# Patient Record
Sex: MALE | Race: ASIAN | NOT HISPANIC OR LATINO | Employment: FULL TIME | URBAN - METROPOLITAN AREA
[De-identification: names, ages, dates, MRNs, and addresses within clinical notes are randomized per-mention and may not be internally consistent; named-entity substitution may affect disease eponyms.]

---

## 2020-03-09 ENCOUNTER — TRANSCRIBE ORDERS (OUTPATIENT)
Dept: ADMINISTRATIVE | Facility: HOSPITAL | Age: 45
End: 2020-03-09

## 2020-03-09 ENCOUNTER — LAB (OUTPATIENT)
Dept: LAB | Facility: HOSPITAL | Age: 45
End: 2020-03-09

## 2020-03-09 DIAGNOSIS — M25.562 LEFT KNEE PAIN, UNSPECIFIED CHRONICITY: Primary | ICD-10-CM

## 2020-03-09 DIAGNOSIS — M25.562 LEFT KNEE PAIN, UNSPECIFIED CHRONICITY: ICD-10-CM

## 2020-03-09 PROCEDURE — 87015 SPECIMEN INFECT AGNT CONCNTJ: CPT

## 2020-03-09 PROCEDURE — 87205 SMEAR GRAM STAIN: CPT

## 2020-03-09 PROCEDURE — 87070 CULTURE OTHR SPECIMN AEROBIC: CPT

## 2020-03-14 LAB
BACTERIA FLD CULT: NORMAL
GRAM STN SPEC: NORMAL

## 2021-02-08 ENCOUNTER — LAB (OUTPATIENT)
Dept: LAB | Facility: HOSPITAL | Age: 46
End: 2021-02-08

## 2021-02-08 ENCOUNTER — OFFICE VISIT (OUTPATIENT)
Dept: INTERNAL MEDICINE | Facility: CLINIC | Age: 46
End: 2021-02-08

## 2021-02-08 VITALS
WEIGHT: 147.6 LBS | HEIGHT: 66 IN | OXYGEN SATURATION: 99 % | BODY MASS INDEX: 23.72 KG/M2 | RESPIRATION RATE: 16 BRPM | TEMPERATURE: 97.4 F | DIASTOLIC BLOOD PRESSURE: 84 MMHG | HEART RATE: 66 BPM | SYSTOLIC BLOOD PRESSURE: 122 MMHG

## 2021-02-08 DIAGNOSIS — Z13.29 SCREENING FOR THYROID DISORDER: ICD-10-CM

## 2021-02-08 DIAGNOSIS — Z13.21 ENCOUNTER FOR VITAMIN DEFICIENCY SCREENING: ICD-10-CM

## 2021-02-08 DIAGNOSIS — Z00.00 VISIT FOR WELL MAN HEALTH CHECK: Primary | ICD-10-CM

## 2021-02-08 DIAGNOSIS — Z13.1 SCREENING FOR DIABETES MELLITUS: ICD-10-CM

## 2021-02-08 DIAGNOSIS — J45.990 EXERCISE-INDUCED ASTHMA: ICD-10-CM

## 2021-02-08 DIAGNOSIS — Z11.59 ENCOUNTER FOR HEPATITIS C SCREENING TEST FOR LOW RISK PATIENT: ICD-10-CM

## 2021-02-08 DIAGNOSIS — Z13.220 SCREENING FOR LIPID DISORDERS: ICD-10-CM

## 2021-02-08 LAB
25(OH)D3 SERPL-MCNC: 22.9 NG/ML (ref 30–100)
ALBUMIN SERPL-MCNC: 4.4 G/DL (ref 3.5–5.2)
ALBUMIN/GLOB SERPL: 1.6 G/DL
ALP SERPL-CCNC: 51 U/L (ref 39–117)
ALT SERPL W P-5'-P-CCNC: 19 U/L (ref 1–41)
ANION GAP SERPL CALCULATED.3IONS-SCNC: 9.2 MMOL/L (ref 5–15)
AST SERPL-CCNC: 24 U/L (ref 1–40)
BASOPHILS # BLD AUTO: 0.03 10*3/MM3 (ref 0–0.2)
BASOPHILS NFR BLD AUTO: 0.6 % (ref 0–1.5)
BILIRUB SERPL-MCNC: 0.4 MG/DL (ref 0–1.2)
BUN SERPL-MCNC: 18 MG/DL (ref 6–20)
BUN/CREAT SERPL: 18.9 (ref 7–25)
CALCIUM SPEC-SCNC: 9 MG/DL (ref 8.6–10.5)
CHLORIDE SERPL-SCNC: 101 MMOL/L (ref 98–107)
CHOLEST SERPL-MCNC: 164 MG/DL (ref 0–200)
CO2 SERPL-SCNC: 27.8 MMOL/L (ref 22–29)
CREAT SERPL-MCNC: 0.95 MG/DL (ref 0.76–1.27)
DEPRECATED RDW RBC AUTO: 39.4 FL (ref 37–54)
EOSINOPHIL # BLD AUTO: 0.28 10*3/MM3 (ref 0–0.4)
EOSINOPHIL NFR BLD AUTO: 5.3 % (ref 0.3–6.2)
ERYTHROCYTE [DISTWIDTH] IN BLOOD BY AUTOMATED COUNT: 12.4 % (ref 12.3–15.4)
GFR SERPL CREATININE-BSD FRML MDRD: 105 ML/MIN/1.73
GFR SERPL CREATININE-BSD FRML MDRD: 87 ML/MIN/1.73
GLOBULIN UR ELPH-MCNC: 2.7 GM/DL
GLUCOSE SERPL-MCNC: 87 MG/DL (ref 65–99)
HBA1C MFR BLD: 5.2 % (ref 4.8–5.6)
HCT VFR BLD AUTO: 49.2 % (ref 37.5–51)
HCV AB SER DONR QL: NORMAL
HDLC SERPL-MCNC: 42 MG/DL (ref 40–60)
HGB BLD-MCNC: 16.8 G/DL (ref 13–17.7)
IMM GRANULOCYTES # BLD AUTO: 0.01 10*3/MM3 (ref 0–0.05)
IMM GRANULOCYTES NFR BLD AUTO: 0.2 % (ref 0–0.5)
LDLC SERPL CALC-MCNC: 105 MG/DL (ref 0–100)
LDLC/HDLC SERPL: 2.47 {RATIO}
LYMPHOCYTES # BLD AUTO: 1.87 10*3/MM3 (ref 0.7–3.1)
LYMPHOCYTES NFR BLD AUTO: 35.3 % (ref 19.6–45.3)
MCH RBC QN AUTO: 29.8 PG (ref 26.6–33)
MCHC RBC AUTO-ENTMCNC: 34.1 G/DL (ref 31.5–35.7)
MCV RBC AUTO: 87.4 FL (ref 79–97)
MONOCYTES # BLD AUTO: 0.47 10*3/MM3 (ref 0.1–0.9)
MONOCYTES NFR BLD AUTO: 8.9 % (ref 5–12)
NEUTROPHILS NFR BLD AUTO: 2.63 10*3/MM3 (ref 1.7–7)
NEUTROPHILS NFR BLD AUTO: 49.7 % (ref 42.7–76)
NRBC BLD AUTO-RTO: 0 /100 WBC (ref 0–0.2)
PLATELET # BLD AUTO: 161 10*3/MM3 (ref 140–450)
PMV BLD AUTO: 12.5 FL (ref 6–12)
POTASSIUM SERPL-SCNC: 4.4 MMOL/L (ref 3.5–5.2)
PROT SERPL-MCNC: 7.1 G/DL (ref 6–8.5)
RBC # BLD AUTO: 5.63 10*6/MM3 (ref 4.14–5.8)
SODIUM SERPL-SCNC: 138 MMOL/L (ref 136–145)
T-UPTAKE NFR SERPL: 1.03 TBI (ref 0.8–1.3)
T4 SERPL-MCNC: 7.29 MCG/DL (ref 4.5–11.7)
TRIGL SERPL-MCNC: 92 MG/DL (ref 0–150)
TSH SERPL DL<=0.05 MIU/L-ACNC: 1.17 UIU/ML (ref 0.27–4.2)
VLDLC SERPL-MCNC: 17 MG/DL (ref 5–40)
WBC # BLD AUTO: 5.29 10*3/MM3 (ref 3.4–10.8)

## 2021-02-08 PROCEDURE — 84479 ASSAY OF THYROID (T3 OR T4): CPT | Performed by: FAMILY MEDICINE

## 2021-02-08 PROCEDURE — 80061 LIPID PANEL: CPT | Performed by: FAMILY MEDICINE

## 2021-02-08 PROCEDURE — 99386 PREV VISIT NEW AGE 40-64: CPT | Performed by: FAMILY MEDICINE

## 2021-02-08 PROCEDURE — 85025 COMPLETE CBC W/AUTO DIFF WBC: CPT | Performed by: FAMILY MEDICINE

## 2021-02-08 PROCEDURE — 36415 COLL VENOUS BLD VENIPUNCTURE: CPT | Performed by: FAMILY MEDICINE

## 2021-02-08 PROCEDURE — 84443 ASSAY THYROID STIM HORMONE: CPT | Performed by: FAMILY MEDICINE

## 2021-02-08 PROCEDURE — 99213 OFFICE O/P EST LOW 20 MIN: CPT | Performed by: FAMILY MEDICINE

## 2021-02-08 PROCEDURE — 83036 HEMOGLOBIN GLYCOSYLATED A1C: CPT | Performed by: FAMILY MEDICINE

## 2021-02-08 PROCEDURE — 82306 VITAMIN D 25 HYDROXY: CPT | Performed by: FAMILY MEDICINE

## 2021-02-08 PROCEDURE — 84436 ASSAY OF TOTAL THYROXINE: CPT | Performed by: FAMILY MEDICINE

## 2021-02-08 PROCEDURE — 80053 COMPREHEN METABOLIC PANEL: CPT | Performed by: FAMILY MEDICINE

## 2021-02-08 PROCEDURE — 86803 HEPATITIS C AB TEST: CPT | Performed by: FAMILY MEDICINE

## 2021-02-08 RX ORDER — ALBUTEROL SULFATE 90 UG/1
2 AEROSOL, METERED RESPIRATORY (INHALATION) EVERY 4 HOURS PRN
Qty: 18 G | Refills: 2 | Status: SHIPPED | OUTPATIENT
Start: 2021-02-08

## 2021-02-08 NOTE — PROGRESS NOTES
Subjective   Latonya Rosa is a 45 y.o. male and is here for a comprehensive physical exam. The patient reports no problems.    Patient notes that occasionally he does have some shortness of breath, he believes is from the cold.  Patient states that he feels that it could be from wearing a mask.  When he does not wear a mask he states that he can feel he is breathing well.  However he can have some shortness of breath after physical activity or during it.  He has no other prior history.        Social History:   Social History     Socioeconomic History   • Marital status:      Spouse name: Not on file   • Number of children: Not on file   • Years of education: Not on file   • Highest education level: Not on file   Tobacco Use   • Smoking status: Never Smoker   • Smokeless tobacco: Never Used   Substance and Sexual Activity   • Alcohol use: Yes   • Drug use: Never   • Sexual activity: Yes     Partners: Male       Family History: History reviewed. No pertinent family history.    Past Medical History: History reviewed. No pertinent past medical history.    The following portions of the patient's history were reviewed and updated as appropriate: allergies, current medications, past family history, past medical history, past social history, past surgical history and problem list.    Review of Systems    Review of Systems   Constitutional: Negative for chills and fever.   HENT: Negative for congestion, rhinorrhea, sinus pain and sore throat.    Eyes: Negative for photophobia and visual disturbance.   Respiratory: Negative for cough, chest tightness and shortness of breath.    Cardiovascular: Negative for chest pain and palpitations.   Gastrointestinal: Negative for diarrhea, nausea and vomiting.   Genitourinary: Negative for dysuria, frequency and urgency.   Skin: Negative for rash and wound.   Neurological: Negative for dizziness and syncope.   Psychiatric/Behavioral: Negative for behavioral problems and  confusion.       Objective   Physical Exam  Vitals signs and nursing note reviewed.   Constitutional:       Appearance: He is well-developed.   HENT:      Head: Normocephalic and atraumatic.      Right Ear: External ear normal.      Left Ear: External ear normal.   Neck:      Musculoskeletal: Normal range of motion and neck supple.   Cardiovascular:      Rate and Rhythm: Normal rate and regular rhythm.      Heart sounds: Normal heart sounds.   Pulmonary:      Effort: Pulmonary effort is normal. No respiratory distress.      Breath sounds: Normal breath sounds.   Abdominal:      Palpations: Abdomen is soft.      Tenderness: There is no abdominal tenderness. There is no guarding.   Musculoskeletal: Normal range of motion.   Lymphadenopathy:      Cervical: No cervical adenopathy.   Skin:     General: Skin is warm.   Neurological:      Mental Status: He is alert and oriented to person, place, and time.   Psychiatric:         Behavior: Behavior normal.         Vitals:    02/08/21 0910   BP: 122/84   Pulse: 66   Resp: 16   Temp: 97.4 °F (36.3 °C)   SpO2: 99%     Body mass index is 24.01 kg/m².    Medications:   Current Outpatient Medications:   •  albuterol sulfate  (90 Base) MCG/ACT inhaler, Inhale 2 puffs Every 4 (Four) Hours As Needed for Wheezing or Shortness of Air., Disp: 18 g, Rfl: 2       Assessment/Plan   Healthy male exam.      1. Healthcare Maintenance:  2. Patient Counseling:  --Nutrition: Stressed importance of moderation in sodium/caffeine intake, saturated fat and cholesterol, caloric balance, sufficient intake of fresh fruits, vegetables, fiber, calcium and vit D  --Exercise: Recommended 30 minutes of exercise daily.   --Immunizations reviewed.  --Discussed benefits of screening colonoscopy. Start screening in 2 years.     Diagnoses and all orders for this visit:    Visit for [facility] health check  -     CBC & Differential  -     Comprehensive Metabolic Panel  -     CBC Auto  Differential    Screening for lipid disorders  -     Lipid Panel With LDL / HDL Ratio    Screening for diabetes mellitus  -     Hemoglobin A1c    Screening for thyroid disorder  -     Thyroid Panel With TSH    Encounter for vitamin deficiency screening  -     Vitamin D 25 Hydroxy    Encounter for hepatitis C screening test for low risk patient  -     Hepatitis C Antibody    Exercise-induced asthma        -     We will start patient on albuterol inhaler, and see how he is doing.  We will follow up in 1 month.    Other orders  -     albuterol sulfate  (90 Base) MCG/ACT inhaler; Inhale 2 puffs Every 4 (Four) Hours As Needed for Wheezing or Shortness of Air.        No follow-ups on file.           Dictated utilizing Dragon Voice Recognition Software

## 2021-02-09 DIAGNOSIS — E55.9 VITAMIN D DEFICIENCY: Primary | ICD-10-CM

## 2021-02-09 NOTE — PROGRESS NOTES
Please inform the patient of the following abnormal results.  Low vitamin D.  Patient needs to start doing 5000 IUs of vitamin D daily.  Would like to recheck in 4 months.

## 2025-06-27 ENCOUNTER — HOSPITAL ENCOUNTER (OUTPATIENT)
Facility: HOSPITAL | Age: 50
Setting detail: OBSERVATION
Discharge: HOME | End: 2025-06-27
Attending: EMERGENCY MEDICINE | Admitting: EMERGENCY MEDICINE
Payer: COMMERCIAL

## 2025-06-27 ENCOUNTER — APPOINTMENT (OUTPATIENT)
Dept: GENERAL RADIOLOGY | Facility: HOSPITAL | Age: 50
End: 2025-06-27
Payer: COMMERCIAL

## 2025-06-27 ENCOUNTER — APPOINTMENT (OUTPATIENT)
Dept: CARDIOLOGY | Facility: HOSPITAL | Age: 50
End: 2025-06-27
Payer: COMMERCIAL

## 2025-06-27 ENCOUNTER — READMISSION MANAGEMENT (OUTPATIENT)
Dept: CALL CENTER | Facility: HOSPITAL | Age: 50
End: 2025-06-27
Payer: COMMERCIAL

## 2025-06-27 VITALS
SYSTOLIC BLOOD PRESSURE: 150 MMHG | HEIGHT: 67 IN | RESPIRATION RATE: 16 BRPM | HEART RATE: 68 BPM | OXYGEN SATURATION: 98 % | TEMPERATURE: 97.9 F | BODY MASS INDEX: 24.91 KG/M2 | DIASTOLIC BLOOD PRESSURE: 101 MMHG | WEIGHT: 158.73 LBS

## 2025-06-27 DIAGNOSIS — R07.89 OTHER CHEST PAIN: Primary | ICD-10-CM

## 2025-06-27 PROBLEM — R07.9 CHEST PAIN: Status: ACTIVE | Noted: 2025-06-27

## 2025-06-27 LAB
ALBUMIN SERPL-MCNC: 4.5 G/DL (ref 3.5–5.2)
ALBUMIN/GLOB SERPL: 2 G/DL
ALP SERPL-CCNC: 55 U/L (ref 39–117)
ALT SERPL W P-5'-P-CCNC: 26 U/L (ref 1–41)
ANION GAP SERPL CALCULATED.3IONS-SCNC: 12 MMOL/L (ref 5–15)
AORTIC DIMENSIONLESS INDEX: 0.91 (DI)
ASCENDING AORTA: 2.9 CM
AST SERPL-CCNC: 24 U/L (ref 1–40)
AV MEAN PRESS GRAD SYS DOP V1V2: 2.7 MMHG
AV VMAX SYS DOP: 112.7 CM/SEC
BASOPHILS # BLD AUTO: 0.03 10*3/MM3 (ref 0–0.2)
BASOPHILS NFR BLD AUTO: 0.4 % (ref 0–1.5)
BH CV ECHO MEAS - ACS: 1.82 CM
BH CV ECHO MEAS - AO MAX PG: 5.1 MMHG
BH CV ECHO MEAS - AO ROOT DIAM: 3.2 CM
BH CV ECHO MEAS - AO V2 VTI: 21.3 CM
BH CV ECHO MEAS - AVA(I,D): 2.39 CM2
BH CV ECHO MEAS - EDV(CUBED): 62.5 ML
BH CV ECHO MEAS - EDV(MOD-SP2): 88 ML
BH CV ECHO MEAS - EDV(MOD-SP4): 81 ML
BH CV ECHO MEAS - EF(MOD-SP2): 70.5 %
BH CV ECHO MEAS - EF(MOD-SP4): 69.1 %
BH CV ECHO MEAS - ESV(CUBED): 20.1 ML
BH CV ECHO MEAS - ESV(MOD-SP2): 26 ML
BH CV ECHO MEAS - ESV(MOD-SP4): 25 ML
BH CV ECHO MEAS - FS: 31.5 %
BH CV ECHO MEAS - IVS/LVPW: 4.4 CM
BH CV ECHO MEAS - IVSD: 4.9 CM
BH CV ECHO MEAS - LAT PEAK E' VEL: 13.4 CM/SEC
BH CV ECHO MEAS - LV DIASTOLIC VOL/BSA (35-75): 44.2 CM2
BH CV ECHO MEAS - LV MASS(C)D: 773.7 GRAMS
BH CV ECHO MEAS - LV MAX PG: 4.9 MMHG
BH CV ECHO MEAS - LV MEAN PG: 2.18 MMHG
BH CV ECHO MEAS - LV SYSTOLIC VOL/BSA (12-30): 13.6 CM2
BH CV ECHO MEAS - LV V1 MAX: 110.6 CM/SEC
BH CV ECHO MEAS - LV V1 VTI: 19.4 CM
BH CV ECHO MEAS - LVIDD: 4 CM
BH CV ECHO MEAS - LVIDS: 2.7 CM
BH CV ECHO MEAS - LVOT AREA: 2.6 CM2
BH CV ECHO MEAS - LVOT DIAM: 1.82 CM
BH CV ECHO MEAS - LVPWD: 1.1 CM
BH CV ECHO MEAS - MED PEAK E' VEL: 7.7 CM/SEC
BH CV ECHO MEAS - MV A DUR: 0.13 SEC
BH CV ECHO MEAS - MV A MAX VEL: 68.1 CM/SEC
BH CV ECHO MEAS - MV DEC SLOPE: 646.6 CM/SEC2
BH CV ECHO MEAS - MV DEC TIME: 0.14 SEC
BH CV ECHO MEAS - MV E MAX VEL: 82.3 CM/SEC
BH CV ECHO MEAS - MV E/A: 1.21
BH CV ECHO MEAS - MV MAX PG: 4.5 MMHG
BH CV ECHO MEAS - MV MEAN PG: 1.34 MMHG
BH CV ECHO MEAS - MV P1/2T: 48.6 MSEC
BH CV ECHO MEAS - MV V2 VTI: 24.5 CM
BH CV ECHO MEAS - MVA(P1/2T): 4.5 CM2
BH CV ECHO MEAS - MVA(VTI): 2.07 CM2
BH CV ECHO MEAS - PA ACC TIME: 0.1 SEC
BH CV ECHO MEAS - PA V2 MAX: 82.8 CM/SEC
BH CV ECHO MEAS - PULM A REVS DUR: 0.13 SEC
BH CV ECHO MEAS - PULM A REVS VEL: 24.4 CM/SEC
BH CV ECHO MEAS - PULM SYS VEL: 18.9 CM/SEC
BH CV ECHO MEAS - RAP SYSTOLE: 8 MMHG
BH CV ECHO MEAS - RV MAX PG: 2.07 MMHG
BH CV ECHO MEAS - RV V1 MAX: 72 CM/SEC
BH CV ECHO MEAS - RV V1 VTI: 14.6 CM
BH CV ECHO MEAS - RVSP: 28 MMHG
BH CV ECHO MEAS - SV(LVOT): 50.8 ML
BH CV ECHO MEAS - SV(MOD-SP2): 62 ML
BH CV ECHO MEAS - SV(MOD-SP4): 56 ML
BH CV ECHO MEAS - SVI(LVOT): 27.7 ML/M2
BH CV ECHO MEAS - SVI(MOD-SP2): 33.9 ML/M2
BH CV ECHO MEAS - SVI(MOD-SP4): 30.6 ML/M2
BH CV ECHO MEAS - TR MAX PG: 19.6 MMHG
BH CV ECHO MEAS - TR MAX VEL: 221.3 CM/SEC
BH CV ECHO MEASUREMENTS AVERAGE E/E' RATIO: 7.8
BH CV XLRA - RV BASE: 3 CM
BH CV XLRA - RV LENGTH: 6.7 CM
BH CV XLRA - TDI S': 12.4 CM/SEC
BILIRUB SERPL-MCNC: 0.4 MG/DL (ref 0–1.2)
BUN SERPL-MCNC: 15 MG/DL (ref 6–20)
BUN/CREAT SERPL: 15.8 (ref 7–25)
CALCIUM SPEC-SCNC: 9 MG/DL (ref 8.6–10.5)
CHLORIDE SERPL-SCNC: 103 MMOL/L (ref 98–107)
CHOLEST SERPL-MCNC: 165 MG/DL (ref 0–200)
CO2 SERPL-SCNC: 23 MMOL/L (ref 22–29)
CREAT SERPL-MCNC: 0.95 MG/DL (ref 0.76–1.27)
D DIMER PPP FEU-MCNC: 0.1 MCGFEU/ML (ref 0–0.5)
DEPRECATED RDW RBC AUTO: 38.3 FL (ref 37–54)
EGFRCR SERPLBLD CKD-EPI 2021: 98.7 ML/MIN/1.73
EOSINOPHIL # BLD AUTO: 0.6 10*3/MM3 (ref 0–0.4)
EOSINOPHIL NFR BLD AUTO: 8.6 % (ref 0.3–6.2)
ERYTHROCYTE [DISTWIDTH] IN BLOOD BY AUTOMATED COUNT: 12.2 % (ref 12.3–15.4)
GLOBULIN UR ELPH-MCNC: 2.2 GM/DL
GLUCOSE SERPL-MCNC: 101 MG/DL (ref 65–99)
HCT VFR BLD AUTO: 47.7 % (ref 37.5–51)
HDLC SERPL-MCNC: 38 MG/DL (ref 40–60)
HGB BLD-MCNC: 16 G/DL (ref 13–17.7)
IMM GRANULOCYTES # BLD AUTO: 0.01 10*3/MM3 (ref 0–0.05)
IMM GRANULOCYTES NFR BLD AUTO: 0.1 % (ref 0–0.5)
LDLC SERPL CALC-MCNC: 113 MG/DL (ref 0–100)
LDLC/HDLC SERPL: 2.97 {RATIO}
LEFT ATRIUM VOLUME INDEX: 13.4 ML/M2
LV EF BIPLANE MOD: 68.3 %
LYMPHOCYTES # BLD AUTO: 3.19 10*3/MM3 (ref 0.7–3.1)
LYMPHOCYTES NFR BLD AUTO: 45.8 % (ref 19.6–45.3)
MCH RBC QN AUTO: 28.6 PG (ref 26.6–33)
MCHC RBC AUTO-ENTMCNC: 33.5 G/DL (ref 31.5–35.7)
MCV RBC AUTO: 85.3 FL (ref 79–97)
MONOCYTES # BLD AUTO: 0.62 10*3/MM3 (ref 0.1–0.9)
MONOCYTES NFR BLD AUTO: 8.9 % (ref 5–12)
NEUTROPHILS NFR BLD AUTO: 2.52 10*3/MM3 (ref 1.7–7)
NEUTROPHILS NFR BLD AUTO: 36.2 % (ref 42.7–76)
PLATELET # BLD AUTO: 197 10*3/MM3 (ref 140–450)
PMV BLD AUTO: 10.4 FL (ref 6–12)
POTASSIUM SERPL-SCNC: 4.1 MMOL/L (ref 3.5–5.2)
PROT SERPL-MCNC: 6.7 G/DL (ref 6–8.5)
QT INTERVAL: 370 MS
QTC INTERVAL: 400 MS
RBC # BLD AUTO: 5.59 10*6/MM3 (ref 4.14–5.8)
SINUS: 2.7 CM
SODIUM SERPL-SCNC: 138 MMOL/L (ref 136–145)
STJ: 2.6 CM
TRIGL SERPL-MCNC: 71 MG/DL (ref 0–150)
TROPONIN T SERPL HS-MCNC: 7 NG/L
TROPONIN T SERPL HS-MCNC: <6 NG/L
VLDLC SERPL-MCNC: 14 MG/DL (ref 5–40)
WBC NRBC COR # BLD AUTO: 6.97 10*3/MM3 (ref 3.4–10.8)

## 2025-06-27 PROCEDURE — 80053 COMPREHEN METABOLIC PANEL: CPT | Performed by: EMERGENCY MEDICINE

## 2025-06-27 PROCEDURE — 25510000001 PERFLUTREN 6.52 MG/ML SUSPENSION 2 ML VIAL: Performed by: NURSE PRACTITIONER

## 2025-06-27 PROCEDURE — 99285 EMERGENCY DEPT VISIT HI MDM: CPT | Performed by: EMERGENCY MEDICINE

## 2025-06-27 PROCEDURE — G0378 HOSPITAL OBSERVATION PER HR: HCPCS

## 2025-06-27 PROCEDURE — 84484 ASSAY OF TROPONIN QUANT: CPT | Performed by: EMERGENCY MEDICINE

## 2025-06-27 PROCEDURE — 99204 OFFICE O/P NEW MOD 45 MIN: CPT | Performed by: INTERNAL MEDICINE

## 2025-06-27 PROCEDURE — 85025 COMPLETE CBC W/AUTO DIFF WBC: CPT | Performed by: EMERGENCY MEDICINE

## 2025-06-27 PROCEDURE — 96361 HYDRATE IV INFUSION ADD-ON: CPT

## 2025-06-27 PROCEDURE — 84484 ASSAY OF TROPONIN QUANT: CPT | Performed by: NURSE PRACTITIONER

## 2025-06-27 PROCEDURE — 96360 HYDRATION IV INFUSION INIT: CPT

## 2025-06-27 PROCEDURE — 85379 FIBRIN DEGRADATION QUANT: CPT | Performed by: EMERGENCY MEDICINE

## 2025-06-27 PROCEDURE — 71045 X-RAY EXAM CHEST 1 VIEW: CPT

## 2025-06-27 PROCEDURE — 25810000003 LACTATED RINGERS PER 1000 ML

## 2025-06-27 PROCEDURE — 80061 LIPID PANEL: CPT | Performed by: EMERGENCY MEDICINE

## 2025-06-27 PROCEDURE — 93005 ELECTROCARDIOGRAM TRACING: CPT | Performed by: EMERGENCY MEDICINE

## 2025-06-27 PROCEDURE — 93306 TTE W/DOPPLER COMPLETE: CPT | Performed by: INTERNAL MEDICINE

## 2025-06-27 PROCEDURE — 36415 COLL VENOUS BLD VENIPUNCTURE: CPT

## 2025-06-27 PROCEDURE — 99285 EMERGENCY DEPT VISIT HI MDM: CPT

## 2025-06-27 PROCEDURE — 93010 ELECTROCARDIOGRAM REPORT: CPT | Performed by: INTERNAL MEDICINE

## 2025-06-27 PROCEDURE — 93306 TTE W/DOPPLER COMPLETE: CPT

## 2025-06-27 RX ORDER — LOSARTAN POTASSIUM 25 MG/1
1 TABLET ORAL DAILY
COMMUNITY
Start: 2025-05-27

## 2025-06-27 RX ORDER — ASPIRIN 81 MG/1
324 TABLET, CHEWABLE ORAL ONCE
Status: COMPLETED | OUTPATIENT
Start: 2025-06-27 | End: 2025-06-27

## 2025-06-27 RX ORDER — AMLODIPINE BESYLATE 5 MG/1
5 TABLET ORAL
Status: DISCONTINUED | OUTPATIENT
Start: 2025-06-27 | End: 2025-06-27 | Stop reason: HOSPADM

## 2025-06-27 RX ORDER — ONDANSETRON 4 MG/1
4 TABLET, ORALLY DISINTEGRATING ORAL EVERY 6 HOURS PRN
Status: DISCONTINUED | OUTPATIENT
Start: 2025-06-27 | End: 2025-06-27 | Stop reason: HOSPADM

## 2025-06-27 RX ORDER — SODIUM CHLORIDE 9 MG/ML
40 INJECTION, SOLUTION INTRAVENOUS AS NEEDED
Status: DISCONTINUED | OUTPATIENT
Start: 2025-06-27 | End: 2025-06-27 | Stop reason: HOSPADM

## 2025-06-27 RX ORDER — SODIUM CHLORIDE 0.9 % (FLUSH) 0.9 %
10 SYRINGE (ML) INJECTION EVERY 12 HOURS SCHEDULED
Status: DISCONTINUED | OUTPATIENT
Start: 2025-06-27 | End: 2025-06-27 | Stop reason: HOSPADM

## 2025-06-27 RX ORDER — SODIUM CHLORIDE 0.9 % (FLUSH) 0.9 %
10 SYRINGE (ML) INJECTION AS NEEDED
Status: DISCONTINUED | OUTPATIENT
Start: 2025-06-27 | End: 2025-06-27 | Stop reason: HOSPADM

## 2025-06-27 RX ORDER — NITROGLYCERIN 0.4 MG/1
0.4 TABLET SUBLINGUAL
Status: DISCONTINUED | OUTPATIENT
Start: 2025-06-27 | End: 2025-06-27 | Stop reason: HOSPADM

## 2025-06-27 RX ORDER — LOSARTAN POTASSIUM 25 MG/1
25 TABLET ORAL DAILY
Status: DISCONTINUED | OUTPATIENT
Start: 2025-06-27 | End: 2025-06-27 | Stop reason: HOSPADM

## 2025-06-27 RX ORDER — AMLODIPINE BESYLATE 5 MG/1
5 TABLET ORAL
Qty: 30 TABLET | Refills: 0 | Status: SHIPPED | OUTPATIENT
Start: 2025-06-28

## 2025-06-27 RX ORDER — NITROGLYCERIN 0.4 MG/1
0.4 TABLET SUBLINGUAL
Status: DISCONTINUED | OUTPATIENT
Start: 2025-06-27 | End: 2025-06-27

## 2025-06-27 RX ORDER — ASPIRIN 81 MG/1
TABLET, CHEWABLE ORAL
Status: COMPLETED
Start: 2025-06-27 | End: 2025-06-27

## 2025-06-27 RX ORDER — ONDANSETRON 2 MG/ML
4 INJECTION INTRAMUSCULAR; INTRAVENOUS EVERY 6 HOURS PRN
Status: DISCONTINUED | OUTPATIENT
Start: 2025-06-27 | End: 2025-06-27 | Stop reason: HOSPADM

## 2025-06-27 RX ORDER — SODIUM CHLORIDE, SODIUM LACTATE, POTASSIUM CHLORIDE, CALCIUM CHLORIDE 600; 310; 30; 20 MG/100ML; MG/100ML; MG/100ML; MG/100ML
125 INJECTION, SOLUTION INTRAVENOUS CONTINUOUS
Status: DISCONTINUED | OUTPATIENT
Start: 2025-06-27 | End: 2025-06-27

## 2025-06-27 RX ORDER — NITROGLYCERIN 0.4 MG/1
TABLET SUBLINGUAL
Status: COMPLETED
Start: 2025-06-27 | End: 2025-06-27

## 2025-06-27 RX ADMIN — ASPIRIN 324 MG: 81 TABLET, CHEWABLE ORAL at 03:36

## 2025-06-27 RX ADMIN — SODIUM CHLORIDE, POTASSIUM CHLORIDE, SODIUM LACTATE AND CALCIUM CHLORIDE 125 ML/HR: 600; 310; 30; 20 INJECTION, SOLUTION INTRAVENOUS at 03:30

## 2025-06-27 RX ADMIN — AMLODIPINE BESYLATE 5 MG: 5 TABLET ORAL at 13:17

## 2025-06-27 RX ADMIN — NITROGLYCERIN 0.4 MG: 0.4 TABLET SUBLINGUAL at 03:30

## 2025-06-27 RX ADMIN — Medication 10 ML: at 10:07

## 2025-06-27 RX ADMIN — LOSARTAN POTASSIUM 25 MG: 25 TABLET, FILM COATED ORAL at 10:07

## 2025-06-27 RX ADMIN — PERFLUTREN 2 ML: 6.52 INJECTION, SUSPENSION INTRAVENOUS at 09:00

## 2025-06-27 NOTE — PLAN OF CARE
Goal Outcome Evaluation:  Plan of Care Reviewed With: patient, spouse        Progress: improving  Outcome Evaluation: Pt. D/C today. Pt. to follow up with cardiology outpatient. At this time VSS, pt. A&Ox4, and all questions answered.       Problem: Adult Inpatient Plan of Care  Goal: Plan of Care Review  Outcome: Met  Flowsheets (Taken 6/27/2025 1357)  Progress: improving  Outcome Evaluation: Pt. D/C today. Pt. to follow up with cardiology outpatient. At this time VSS, pt. A&Ox4, and all questions answered.  Plan of Care Reviewed With:   patient   spouse  Goal: Patient-Specific Goal (Individualized)  Outcome: Met  Goal: Absence of Hospital-Acquired Illness or Injury  Outcome: Met  Intervention: Identify and Manage Fall Risk  Recent Flowsheet Documentation  Taken 6/27/2025 1200 by Terrance Fraire, RN  Safety Promotion/Fall Prevention:   activity supervised   fall prevention program maintained   lighting adjusted   safety round/check completed  Intervention: Prevent Infection  Recent Flowsheet Documentation  Taken 6/27/2025 1200 by Terrance Fraire, RN  Infection Prevention:   hand hygiene promoted   single patient room provided   rest/sleep promoted  Goal: Optimal Comfort and Wellbeing  Outcome: Met  Goal: Readiness for Transition of Care  Outcome: Met     Problem: Comorbidity Management  Goal: Blood Pressure in Desired Range  Outcome: Met  Intervention: Maintain Blood Pressure Management  Recent Flowsheet Documentation  Taken 6/27/2025 1200 by Terrance Fraire, RN  Medication Review/Management: medications reviewed

## 2025-06-27 NOTE — CONSULTS
Date of Hospital Visit: 25  Encounter Provider: Simona Chávez RN  Place of Service: [facility] CARDIOLOGY  Patient Name: Latonya Rosa  :1975  Referral Provider: Zeus Walker MD    Chief complaint Chest pain     History of Present Illness    Latonya Rosa is a 50 year old male who presented to the ED on 25 with reports of left sided CP that began 3 hours prior to arrival. He reports it as pressure 3-4/10, radiation to the left arm with associated shortness of breath. He denies that the pain is exertional. Pain was relieved with one sl ntg. Troponin negative x2, D-Dimer negative, CBC and CMP unremarkable. He has a history of HTN and is on losartan.  Patient says he feels fine no current issues.  Patient did not have any symptoms prior to this event.    Chest x-ray 25    Chest pain, elevated BP, no prior cardiac/lung hx. -[facility]     History reviewed. No pertinent past medical history.    History reviewed. No pertinent surgical history.    No current facility-administered medications on file prior to encounter.     Current Outpatient Medications on File Prior to Encounter   Medication Sig Dispense Refill    losartan (COZAAR) 25 MG tablet Take 1 tablet by mouth Daily.      albuterol sulfate  (90 Base) MCG/ACT inhaler Inhale 2 puffs Every 4 (Four) Hours As Needed for Wheezing or Shortness of Air. 18 g 2       Social History     Socioeconomic History    Marital status:    Tobacco Use    Smoking status: Never    Smokeless tobacco: Never   Vaping Use    Vaping status: Never Used   Substance and Sexual Activity    Alcohol use: Yes    Drug use: Never    Sexual activity: Yes     Partners: Male       REVIEW OF SYSTEMS:   12 point ROS was performed and is Negative except as outlined in HPI     Objective:     Vitals:    25 0500 25 0528 25 0603 25 0604   BP: 149/93 142/89 150/100    BP Location:   Left arm    Patient  "Position:   Lying    Pulse: 77 66 72    Resp:  18 16    Temp:  97.5 °F (36.4 °C) 98.1 °F (36.7 °C)    TempSrc:   Oral    SpO2:  100% 98%    Weight:   72.9 kg (160 lb 12.8 oz) 72.9 kg (160 lb 12.8 oz)   Height:   170.2 cm (67\")      Body mass index is 25.18 kg/m².  Flowsheet Rows      Flowsheet Row First Filed Value   Admission Height 160 cm (63\") Documented at 06/27/2025 0157   Admission Weight 71.7 kg (158 lb) Documented at 06/27/2025 0157            Physical Exam   Constitutional: oriented to person, place, and time.  well-developed and well-nourished. No distress.   HENT: Normocephalic.   Eyes: Conjunctivae are normal. No scleral icterus.   Neck: Normal carotid pulses, no hepatojugular reflux and no JVD present. Carotid bruit is not present. No tracheal deviation, no edema and no erythema present. No thyromegaly present.   Cardiovascular: Normal rate, regular rhythm, S1 normal, S2 normal, normal heart sounds and intact distal pulses.   No extrasystoles are present. PMI is not displaced.  Exam reveals no gallop, no distant heart sounds and no friction rub.    No murmur heard.  Pulses:       Radial pulses are 2+ on the right side, and 2+ on the left side.       Dorsalis pedis pulses are 2+ on the right side, and 2+ on the left side.   Pulmonary/Chest: Effort normal and breath sounds normal. No respiratory distress. She has no decreased breath sounds.  no wheezes,  Rhonchi or rales.  no tenderness.   Abdominal: Soft. Bowel sounds are normal. She exhibits no distension and no mass. There is no hepatosplenomegaly. There is no tenderness. There is no rebound and no guarding.   Musculoskeletal:  exhibits no edema, tenderness or deformity.   Neurological: is alert and oriented to person, place, and time.   Skin: Skin is warm and dry. No rash noted. No diaphoretic. No cyanosis or erythema. No pallor. Nails show no clubbing.   Psychiatric: Normal mood and affect.Speech is normal and behavior is normal.    Lab Review:       "          Results from last 7 days   Lab Units 06/27/25  0201   SODIUM mmol/L 138   POTASSIUM mmol/L 4.1   CHLORIDE mmol/L 103   CO2 mmol/L 23.0   BUN mg/dL 15.0   CREATININE mg/dL 0.95   GLUCOSE mg/dL 101*   CALCIUM mg/dL 9.0     Results from last 7 days   Lab Units 06/27/25  0434 06/27/25  0201   HSTROP T ng/L <6 7     Results from last 7 days   Lab Units 06/27/25  0201   WBC 10*3/mm3 6.97   HEMOGLOBIN g/dL 16.0   HEMATOCRIT % 47.7   PLATELETS 10*3/mm3 197                       Interpretation Summary         Left ventricular systolic function is normal. Calculated left ventricular EF = 68.3%    Left ventricular diastolic function was normal.    Estimated right ventricular systolic pressure from tricuspid regurgitation is normal (<35 mmHg).             Assessment:       Diagnosis Plan   1. Other chest pain                Plan:   1.  Episode of chest discomfort that awoke him from sleep.  Patient's troponins are negative.  ECG with probably a repull abnormality.  Echocardiogram that was done this morning is unremarkable.  Blood pressure has been a little bit on the high side.  I would place the patient on amlodipine 5 mg to treat his blood pressure.  Have patient follow-up in 2 to 4 weeks with LUÍS Jesus.  If patient has any further chest discomfort to contact my office or come back to the emergency room.  We may consider a stress test as an outpatient symptoms are very atypical.  Okay to discharge from my standpoint

## 2025-06-27 NOTE — DISCHARGE SUMMARY
ED OBSERVATION PROGRESS/DISCHARGE SUMMARY    Date of Admission: 6/27/2025   LOS: 0 days   PCP: Zeus Walker MD    Final Diagnosis: Chest pain    Hospital Outcome:     50-year-old male with a history of hypertension admitted to the observation unit from an outlying emergency department due to chest pain.    High-sensitivity troponin has been negative x 2.  D-dimer was negative.  CBC and CMP largely unremarkable.  EKG nonischemic.    Cardiology was consulted and they have evaluated the patient.  Echocardiogram this morning was unremarkable.  They recommend starting the patient on amlodipine 5 mg daily.  Advised patient to check his blood pressure 2 times daily for the next 7 to 10 days and record readings for his follow-up.  They will arrange for follow-up in their office in 2 to 4 weeks.  He was encouraged to follow-up with his primary care provider as well.    ROS:  General: no fevers, chills  Respiratory: no cough, dyspnea  Cardiovascular: no chest pain, palpitations  Abdomen: No abdominal pain, nausea, vomiting, or diarrhea  Neurologic: No focal weakness    Objective   Physical Exam:  I have reviewed the vital signs.  Temp:  [97.4 °F (36.3 °C)-98.1 °F (36.7 °C)] 97.9 °F (36.6 °C)  Heart Rate:  [61-77] 68  Resp:  [16-18] 16  BP: (130-160)/() 150/101  General Appearance:    Alert, cooperative, no distress  Head:    Normocephalic, atraumatic  Eyes:    Sclerae anicteric  Neck:   Supple, no mass  Lungs: Clear to auscultation bilaterally, respirations unlabored  Heart: Regular rate and rhythm, S1 and S2 normal, no murmur, rub or gallop  Abdomen:  Soft, nontender, bowel sounds active, nondistended  Extremities: No clubbing, cyanosis, or edema to lower extremities  Pulses:  2+ and symmetric in distal lower extremities  Skin: No rashes   Neurologic: Oriented x3, Normal strength to extremities    Results Review:    I have reviewed the labs, radiology results and diagnostic studies.    Results from last 7 days    Lab Units 06/27/25  0201   WBC 10*3/mm3 6.97   HEMOGLOBIN g/dL 16.0   HEMATOCRIT % 47.7   PLATELETS 10*3/mm3 197     Results from last 7 days   Lab Units 06/27/25  0201   SODIUM mmol/L 138   POTASSIUM mmol/L 4.1   CHLORIDE mmol/L 103   CO2 mmol/L 23.0   BUN mg/dL 15.0   CREATININE mg/dL 0.95   CALCIUM mg/dL 9.0   BILIRUBIN mg/dL 0.4   ALK PHOS U/L 55   ALT (SGPT) U/L 26   AST (SGOT) U/L 24   GLUCOSE mg/dL 101*     Imaging Results (Last 24 Hours)       Procedure Component Value Units Date/Time    XR Chest 1 View [[id number]] Collected: 06/27/25 0607     Updated: 06/27/25 0613    Narrative:      XR CHEST 1 VW-     DATE OF EXAM: 6/27/2025 2:00 AM     INDICATION: Chest pain.     COMPARISON: None available.     TECHNIQUE: A single portable AP view of the chest was obtained.     FINDINGS:  Right positioning.  5. Mild linear bibasilar subsegmental atelectasis and/or scarring. Lungs  otherwise clear. No pneumothorax. Cardiac and mediastinal contours  within normal limits. No acute osseous abnormality is identified.       Impression:      Mild linear bibasilar subsegmental atelectasis and/or scarring.     This report was finalized on 6/27/2025 6:10 AM by Kvng Campuzano MD on  Workstation: [id number]               I have reviewed the medications.     Discharge Medications        New Medications        Instructions Start Date   amLODIPine 5 MG tablet  Commonly known as: NORVASC   5 mg, Oral, Every 24 Hours Scheduled   Start Date: June 28, 2025            Continue These Medications        Instructions Start Date   albuterol sulfate  (90 Base) MCG/ACT inhaler  Commonly known as: PROVENTIL HFA;VENTOLIN HFA;PROAIR HFA   2 puffs, Inhalation, Every 4 Hours PRN      losartan 25 MG tablet  Commonly known as: COZAAR   1 tablet, Daily              ---------------------------------------------------------------------------------------------  Assessment & Plan   Assessment/Problem List    Chest pain    Plan:    Chest pain  Labs  unremarkable  HS troponin negative x 2  EKG sinus rhythm, nonischemic  X-rays pending  Total cholesterol is 165,   Echocardiogram unremarkable  Cardiology consulted  Will arrange for follow-up in 2 to 4 weeks     Hypertension  Continue losartan  Start amlodipine 5 mg daily  Record blood pressure readings for the next 7 to 10 days     VTE Prophylaxis:  Mechanical VTE prophylaxis orders are present.    Disposition: Home    Follow-up after Discharge: Primary care and cardiology    This note will serve as a discharge summary    LUÍS Bhatt 06/27/25 13:30 EDT    I have worn appropriate PPE during this patient encounter, sanitized my hands both with entering and exiting patient's room.    31 minutes has been spent by [facility] providers in the care of this patient while under observation status on this date 06/27/25

## 2025-06-27 NOTE — OUTREACH NOTE
Prep Survey      Flowsheet Row Responses   [facility] patient discharged from? [place]   Is LACE score < 7 ? Yes   Eligibility [facility]   Date of Admission 06/27/25   Date of Discharge 06/27/25   Discharge Disposition Home or Self Care   Discharge diagnosis Chest pain   Does the patient have one of the following disease processes/diagnoses(primary or secondary)? Other   Does the patient have Home health ordered? No   Is there a DME ordered? No   Prep survey completed? Yes            AUNG SHIN - Registered Nurse

## 2025-06-27 NOTE — PROGRESS NOTES
BETTINA ABDI Attestation Note     I supervised care provided by the midlevel provider. We have discussed this patient's history, physical exam, and treatment plan. I have reviewed the midlevel provider's note and I agree with the midlevel provider's findings and plan of care.   SHARED VISIT: This visit was performed by BOTH a physician and an APC. The substantive portion of the medical decision making was performed by this attesting physician who made or approved the management plan and takes responsibility for patient management. All studies in the APC note (if performed) were independently interpreted by me.   I have personally had a face to face encounter with the patient.   My personal findings are documented below:      Subjective  Pt is a 50 y.o. male admitted from [facility] Emergency Department for evaluation and treatment of chest pain.  Currently chest pain-free    Physical Exam  GENERAL: Alert and in NAD, Vitals reviewed-blood pressure 153/102, pulse 70.  Temperature and O2 sats normal  HENT: nares patent  EYES: no scleral icterus  CV: regular rhythm, regular rate-no murmur  RESPIRATORY: normal effort, clear to auscultation bilaterally  ABDOMEN: soft, nontender to palpation  MUSCULOSKELETAL: no deformity  NEURO: Strength sensation and coordination are grossly intact.  Speech and mentation are unremarkable  SKIN: warm, dry    Assessment/Plan  I discussed tx and evaluation of this patient with LUÍS Palomino.  EKG at [facility] ED with nonspecific changes.  High-sensitivity troponin negative x 2.  Patient with cardiac risk factor of hypertension.  Pain was relieved with nitroglycerin at [facility] ED.  Plan echocardiogram, cardiology consultation for further cardiac workup.

## 2025-06-27 NOTE — FSED PROVIDER NOTE
[facility]  eMERGENCY dEPARTMENT eNCOUnter      Pt Name: Latonya Rosa  MRN: [id number]  Birthdate 1975  Date of evaluation: 6/27/2025  Provider: Kalyan Sexton MD    CHIEF COMPLAINT       Chief Complaint   Patient presents with    Chest Pain       Nurses Notes reviewed and I agree except as noted in the HPI.      HISTORY OF PRESENT ILLNESS       History provided by:  Patient        Latonya Rosa is a 50 y.o. male who presents to the emergency department ED for left sided CP.  CP began about 3 hrs ago.  He describes the CP as pressure like in nature with radiation to his left arm.  He has pain at 3-4/10.  No n/v with the discomfort.  He confirms SOB.  There has been no exertion pain with the discomfort.  He has a hx of HTN.  he has never smoked.  He has some family hx.        REVIEW OF SYSTEMS       Review of Systems   Constitutional:  Negative for fatigue and fever.   HENT:  Positive for congestion.    Respiratory:  Positive for cough and shortness of breath.    Cardiovascular:  Positive for chest pain. Negative for palpitations.   Gastrointestinal:  Negative for abdominal pain, nausea and vomiting.         PAST MEDICAL HISTORY     History reviewed. No pertinent past medical history.      SURGICAL HISTORY        has no past surgical history on file.      CURRENT MEDICATIONS          Medication List        ASK your doctor about these medications      albuterol sulfate  (90 Base) MCG/ACT inhaler  Commonly known as: PROVENTIL HFA;VENTOLIN HFA;PROAIR HFA  Inhale 2 puffs Every 4 (Four) Hours As Needed for Wheezing or Shortness of Air.     losartan 25 MG tablet  Commonly known as: COZAAR                ALLERGIES       has no known allergies.      FAMILY HISTORY       has no family status information on file.    family history is not on file.    SOCIAL HISTORY        reports that he has never smoked. He has never used smokeless tobacco. He reports current alcohol use.  "He reports that he does not use drugs.    PHYSICAL EXAM       INITIAL VITALS:  height is 160 cm (63\") and weight is 71.7 kg (158 lb). His oral temperature is 97.4 °F (36.3 °C). His blood pressure is 130/86 and his pulse is 62. His respiration is 16 and oxygen saturation is 100%.      Physical Exam  Vitals and nursing note reviewed.   Constitutional:       Appearance: Normal appearance. He is not ill-appearing.   HENT:      Head: Normocephalic and atraumatic.      Nose: Nose normal.      Mouth/Throat:      Mouth: Mucous membranes are moist.      Pharynx: Oropharynx is clear. No oropharyngeal exudate or posterior oropharyngeal erythema.   Eyes:      Extraocular Movements: Extraocular movements intact.      Pupils: Pupils are equal, round, and reactive to light.   Cardiovascular:      Rate and Rhythm: Normal rate and regular rhythm.      Heart sounds: No murmur heard.  Pulmonary:      Effort: Pulmonary effort is normal.      Breath sounds: Normal breath sounds. No wheezing, rhonchi or rales.   Abdominal:      General: Abdomen is flat.      Palpations: Abdomen is soft.      Tenderness: There is no abdominal tenderness. There is no guarding.   Musculoskeletal:         General: No swelling. Normal range of motion.      Cervical back: Normal range of motion and neck supple. No tenderness.   Skin:     General: Skin is warm and dry.      Capillary Refill: Capillary refill takes less than 2 seconds.   Neurological:      General: No focal deficit present.      Mental Status: He is alert and oriented to person, place, and time.      Cranial Nerves: No cranial nerve deficit.   Psychiatric:         Mood and Affect: Mood normal.         Behavior: Behavior normal.         Thought Content: Thought content normal.           DIAGNOSTIC RESULTS     EKG: All EKG's are interpreted by the Emergency Department Physician who either signs or Co-signs this chart in the absence of a cardiologist.    NSR with a rate of 70, normal axis, normal " WI, QRS and QTc intervals.  T wave inversions in lead III.  No acute ST elevations noted.    RADIOLOGY: non-plain film images(s) such as CT, Ultrasound and MRI are read by the radiologist.  Plain radiographic images are visualized and preliminarily interpreted by the emergency physician unless otherwise stated below.      XR Chest 1 View    (Results Pending)   No acute findings on imaging         LABS:   Lab Results (last 24 hours)       Procedure Component Value Units Date/Time    CBC & Differential [[id number]]  (Abnormal) Collected: 06/27/25 0201    Specimen: Blood Updated: 06/27/25 0354    Narrative:      The following orders were created for panel order CBC & Differential.  Procedure                               Abnormality         Status                     ---------                               -----------         ------                     CBC Auto Differential[[id number]]        Abnormal            Final result                 Please view results for these tests on the individual orders.    Comprehensive Metabolic Panel [[id number]]  (Abnormal) Collected: 06/27/25 0201    Specimen: Blood Updated: 06/27/25 0450     Glucose 101 mg/dL      BUN 15.0 mg/dL      Creatinine 0.95 mg/dL      Sodium 138 mmol/L      Potassium 4.1 mmol/L      Chloride 103 mmol/L      CO2 23.0 mmol/L      Calcium 9.0 mg/dL      Total Protein 6.7 g/dL      Albumin 4.5 g/dL      ALT (SGPT) 26 U/L      AST (SGOT) 24 U/L      Alkaline Phosphatase 55 U/L      Total Bilirubin 0.4 mg/dL      Globulin 2.2 gm/dL      A/G Ratio 2.0 g/dL      BUN/Creatinine Ratio 15.8     Anion Gap 12.0 mmol/L      eGFR 98.7 mL/min/1.73     Narrative:      Unable to Calculate GFR result due to patient's height not being entered.  GFR Categories in Chronic Kidney Disease (CKD)              GFR Category          GFR (mL/min/1.73)    Interpretation  G1                    90 or greater        Normal or high (1)  G2                    60-89                Mild decrease  (1)  G3a                   45-59                Mild to moderate decrease  G3b                   30-44                Moderate to severe decrease  G4                    15-29                Severe decrease  G5                    14 or less           Kidney failure    (1)In the absence of evidence of kidney disease, neither GFR category G1 or G2 fulfill the criteria for CKD.    eGFR calculation 2021 CKD-EPI creatinine equation, which does not include race as a factor    High Sensitivity Troponin T [[id number]]  (Normal) Collected: 06/27/25 0201    Specimen: Blood Updated: 06/27/25 0409     HS Troponin T 7 ng/L     Narrative:      High Sensitive Troponin T Reference Range:  <14.0 ng/L- Negative Female for AMI  <22.0 ng/L- Negative Male for AMI  >=14 - Abnormal Female indicating possible myocardial injury.  >=22 - Abnormal Male indicating possible myocardial injury.   Clinicians would have to utilize clinical acumen, EKG, Troponin, and serial changes to determine if it is an Acute Myocardial Infarction or myocardial injury due to an underlying chronic condition.         CBC Auto Differential [[id number]]  (Abnormal) Collected: 06/27/25 0201    Specimen: Blood Updated: 06/27/25 0354     WBC 6.97 10*3/mm3      RBC 5.59 10*6/mm3      Hemoglobin 16.0 g/dL      Hematocrit 47.7 %      MCV 85.3 fL      MCH 28.6 pg      MCHC 33.5 g/dL      RDW 12.2 %      RDW-SD 38.3 fl      MPV 10.4 fL      Platelets 197 10*3/mm3      Neutrophil % 36.2 %      Lymphocyte % 45.8 %      Monocyte % 8.9 %      Eosinophil % 8.6 %      Basophil % 0.4 %      Immature Grans % 0.1 %      Neutrophils, Absolute 2.52 10*3/mm3      Lymphocytes, Absolute 3.19 10*3/mm3      Monocytes, Absolute 0.62 10*3/mm3      Eosinophils, Absolute 0.60 10*3/mm3      Basophils, Absolute 0.03 10*3/mm3      Immature Grans, Absolute 0.01 10*3/mm3     D-dimer, Quantitative [[id number]]  (Normal) Collected: 06/27/25 0230    Specimen: Blood Updated: 06/27/25 0426     D-Dimer,  "Quantitative 0.10 MCGFEU/mL     Narrative:      According to the assay 's published package insert, a normal (<0.50 MCGFEU/mL) D-dimer result in conjunction with a non-high clinical probability assessment, excludes deep vein thrombosis (DVT) and pulmonary embolism (PE) with high sensitivity.    D-dimer values increase with age and this can make VTE exclusion of an older population difficult. To address this, the [employer], based on best available evidence and recent guidelines, recommends that clinicians use age-adjusted D-dimer thresholds in patients greater than 50 years of age with: a) a low probability of PE who do not meet all Pulmonary Embolism Rule Out Criteria, or b) in those with intermediate probability of PE.   The formula for an age-adjusted D-dimer cut-off is \"age/100\".  For example, a 60 year old patient would have an age-adjusted cut-off of 0.60 MCGFEU/mL and an 80 year old 0.80 MCGFEU/mL.    High Sensitivity Troponin T [[id number]]  (Normal) Collected: 06/27/25 0434    Specimen: Blood Updated: 06/27/25 0454     HS Troponin T <6 ng/L     Narrative:      High Sensitive Troponin T Reference Range:  <14.0 ng/L- Negative Female for AMI  <22.0 ng/L- Negative Male for AMI  >=14 - Abnormal Female indicating possible myocardial injury.  >=22 - Abnormal Male indicating possible myocardial injury.   Clinicians would have to utilize clinical acumen, EKG, Troponin, and serial changes to determine if it is an Acute Myocardial Infarction or myocardial injury due to an underlying chronic condition.                 EMERGENCY DEPARTMENT COURSE:   Vitals:    Vitals:    06/27/25 0159 06/27/25 0330 06/27/25 0400 06/27/25 0407   BP:  135/97 130/86    BP Location:       Patient Position:       Pulse:  69 61 62   Resp:       Temp: 97.4 °F (36.3 °C)      TempSrc: Oral      SpO2:       Weight:       Height:           Medical Decision Making  Patient presented to the ED for chest pain.  Initial " EKG, chest x-ray and troponin were negative for acute findings.  He was given 0.4 mg sublingual nitro.  This resolved his chest pain.  He has a HEART score of 4 placing him at a moderate risk.  Since the nitro and risk factors are elevated, feel it best to admit him for observation.  I spoke to Eliane in the obs unit.  Dr. Arana will be the accepting.    Problems Addressed:  Other chest pain: complicated acute illness or injury    Amount and/or Complexity of Data Reviewed  Labs: ordered.  Radiology: ordered.  ECG/medicine tests: ordered.    Risk  OTC drugs.  Prescription drug management.  Decision regarding hospitalization.         The patient was given the following medications:  Medications   lactated ringers infusion (125 mL/hr Intravenous New Bag 6/27/25 0330)   sodium chloride 0.9 % flush 10 mL (has no administration in time range)   sodium chloride 0.9 % flush 10 mL (has no administration in time range)   sodium chloride 0.9 % infusion 40 mL (has no administration in time range)   ondansetron ODT (ZOFRAN-ODT) disintegrating tablet 4 mg (has no administration in time range)     Or   ondansetron (ZOFRAN) injection 4 mg (has no administration in time range)   nitroglycerin (NITROSTAT) SL tablet 0.4 mg (has no administration in time range)   aspirin chewable tablet 324 mg (324 mg Oral Given 6/27/25 0336)       ED Course as of 06/27/25 0458   Fri Jun 27, 2025   0231 Patient was treated with sublingual nitro, 324 ASA and 125 mL an hour of LR. [PM]   0241 CP almost fully resolved.  [PM]   0255 CP has full resolved Dimer neg. [PM]      ED Course User Index  [PM] Kalyan Sexton MD       CRITICAL CARE:  none    CONSULTS:  none    PROCEDURES:  Procedures  None    DIFFERENTIAL DIAGNOSIS:     Differential diagnoses include, but not limited to ACS, NSTEMI, PE, reflux      FINAL IMPRESSION      1. Other chest pain          DISPOSITION/PLAN     PATIENT REFERRED TO:  No follow-up provider specified.    DISCHARGE  MEDICATIONS:     Medication List        ASK your doctor about these medications      albuterol sulfate  (90 Base) MCG/ACT inhaler  Commonly known as: PROVENTIL HFA;VENTOLIN HFA;PROAIR HFA  Inhale 2 puffs Every 4 (Four) Hours As Needed for Wheezing or Shortness of Air.     losartan 25 MG tablet  Commonly known as: COZAAR              (Please note that portions of this note were completed with a voice recognition program.  Efforts were made to edit the dictations but occasionally words are mis-transcribed.)    Kalyan Sexton MD (electronically signed) Emergency Physician

## 2025-06-27 NOTE — H&P
[facility]   HISTORY AND PHYSICAL    Patient Name: Latonya Rosa  : 1975  MRN: [id number]  Primary Care Physician:  Zeus Walker MD  Date of admission: 2025    Subjective   Subjective     Chief Complaint:   Chief Complaint   Patient presents with    Chest Pain         HPI:  Ltaonya Rosa is a 50 y.o. male with a past medical history significant for hypertension who presented to [facility] with a complaint of chest pain and was admitted to the ED observation unit for further evaluation and workup.    Patient states he was laying in bed around midnight, unable to sleep, when he began having left-sided chest pain, shortness of air, and left arm heaviness.  This pain persisted until he arrived at the emergency department and received a NTG sublingual that relieved his pain.  States he has been pain-free since that time.    ED workup: HS Troponin 7, 6.  CBC and CMP unremarkable.  D-dimer 0.10.  EKG reported sinus rhythm.  Chest x-ray results are pending.  Lipid panel is pending. NTG SL X1 was given in ED with relief of chest pain.    On admission to the ED observation unit, patient is alert and oriented x 4.  Continues to deny any complaints.  Vital signs stable.      Review of Systems   All systems were reviewed and negative except for: As documented in HPI.    Personal History     History reviewed. No pertinent past medical history.    History reviewed. No pertinent surgical history.    Family History: family history is not on file. Otherwise pertinent FHx was reviewed and not pertinent to current issue.    Social History:  reports that he has never smoked. He has never used smokeless tobacco. He reports current alcohol use. He reports that he does not use drugs.    Home Medications:  albuterol sulfate HFA and losartan    Allergies:  No Known Allergies    Objective   Objective     Vitals:   Temp:  [97.4 °F (36.3 °C)-97.5 °F (36.4 °C)] 97.5 °F (36.4 °C)  Heart Rate:  [61-77] 66  Resp:   [16-18] 18  BP: (130-160)/() 142/89  Physical Exam    Constitutional: Awake, alert   Eyes: PERRLA, sclerae anicteric, no conjunctival injection   HENT: NCAT, mucous membranes moist   Neck: trachea midline   Respiratory: Clear to auscultation bilaterally, nonlabored respirations    Cardiovascular: RRR, no murmurs, rubs, or gallops   Musculoskeletal: No bilateral ankle edema, no clubbing or cyanosis to extremities   Psychiatric: Appropriate affect, cooperative  Neurologic: Oriented x 3, strength symmetric in all extremities, Cranial Nerves grossly intact to confrontation, speech clear   Skin: No rashes     Result Review    Result Review:  I have personally reviewed the results from the time of this admission to 6/27/2025 05:45 EDT and agree with these findings:  [x]  Laboratory list / accordion  []  Microbiology  [x]  Radiology  [x]  EKG/Telemetry   []  Cardiology/Vascular   []  Pathology  []  Old records  []  Other:  Most notable findings include: None noted      Assessment & Plan   The ASCVD Risk score (Lorelei DK, et al., 2019) failed to calculate for the following reasons:    Cannot find a previous HDL lab    Cannot find a previous total cholesterol lab    Assessment / Plan     Brief Patient Summary:  Latonya Rosa is a 50 y.o. male who was admitted to the ED observation unit for further evaluation and workup with a complaint of chest pain.  Workup thus far is negative.  Vital signs stable.  Cardiology consult is pending.  Echocardiogram pending.    Active Hospital Problems:  Active Hospital Problems    Diagnosis     **Chest pain      Plan:     Chest pain  Labs unremarkable  HS troponin negative x 2  EKG sinus rhythm, nonischemic  X-rays pending  Lipid profile is pending  Echocardiogram pending  N.p.o.  Cardiology consult pending    Hypertension  Vital signs 4 hours and as needed  Continue losartan    VTE Prophylaxis:  Mechanical VTE prophylaxis orders are present.      CODE STATUS:    Code Status  (Patient has no pulse and is not breathing): CPR (Attempt to Resuscitate)  Medical Interventions (Patient has pulse or is breathing): Full Support  Level Of Support Discussed With: Patient    Admission Status:  I believe this patient meets observation status.    Electronically signed by LUÍS Jones, 06/27/25, 5:45 AM EDT.      75 minutes has been spent by [facility] providers in the care of this patient while under observation status on this date 06/27/25      I have worn appropriate PPE during this patient encounter, sanitized my hands both with entering and exiting patient's room.    I have discussed plan of care with patient including advance care plan and/or surrogate decision maker.  Patient advises that their spouse will be their primary surrogate decision maker

## 2025-06-27 NOTE — ED NOTES
Nursing report ED to floor  [facility]  50 y[facility]  [facility]    HPI :  HPI  Stated Reason for Visit: CP 3 hous PTA;    Chief Complaint  Chief Complaint   Patient presents with    Chest Pain       Admitting doctor:   Darren Arana MD    Admitting diagnosis:   The encounter diagnosis was Other chest pain.    Code status:   Current Code Status       Date Active Code Status Order ID Comments User Context       6/27/2025 0436 CPR (Attempt to Resuscitate) [id number]  Eliane Velasquez, APRN ED        Question Answer    Code Status (Patient has no pulse and is not breathing) CPR (Attempt to Resuscitate)    Medical Interventions (Patient has pulse or is breathing) Full Support    Level Of Support Discussed With Patient                    Allergies:   Patient has no known allergies.    Isolation:   No active isolations    Intake and Output  No intake or output data in the 24 hours ending 06/27/25 0457    Weight:       06/27/25  0157   Weight: 71.7 kg (158 lb)       Most recent vitals:   Vitals:    06/27/25 0159 06/27/25 0330 06/27/25 0400 06/27/25 0407   BP:  135/97 130/86    BP Location:       Patient Position:       Pulse:  69 61 62   Resp:       Temp: 97.4 °F (36.3 °C)      TempSrc: Oral      SpO2:       Weight:       Height:           Active LDAs/IV Access:   Lines, Drains & Airways       Active LDAs       Name Placement date Placement time Site Days    Peripheral IV 06/27/25 0210 20 G Left Antecubital 06/27/25  0210  Antecubital  less than 1                    Labs (abnormal labs have a star):   Labs Reviewed   COMPREHENSIVE METABOLIC PANEL - Abnormal; Notable for the following components:       Result Value    Glucose 101 (*)     All other components within normal limits    Narrative:     Unable to Calculate GFR result due to patient's height not being entered.  GFR Categories in Chronic Kidney Disease (CKD)              GFR Category          GFR (mL/min/1.73)    Interpretation  G1                    90 or greater         Normal or high (1)  G2                    60-89                Mild decrease (1)  G3a                   45-59                Mild to moderate decrease  G3b                   30-44                Moderate to severe decrease  G4                    15-29                Severe decrease  G5                    14 or less           Kidney failure    (1)In the absence of evidence of kidney disease, neither GFR category G1 or G2 fulfill the criteria for CKD.    eGFR calculation 2021 CKD-EPI creatinine equation, which does not include race as a factor   CBC WITH AUTO DIFFERENTIAL - Abnormal; Notable for the following components:    RDW 12.2 (*)     Neutrophil % 36.2 (*)     Lymphocyte % 45.8 (*)     Eosinophil % 8.6 (*)     Lymphocytes, Absolute 3.19 (*)     Eosinophils, Absolute 0.60 (*)     All other components within normal limits   TROPONIN - Normal    Narrative:     High Sensitive Troponin T Reference Range:  <14.0 ng/L- Negative Female for AMI  <22.0 ng/L- Negative Male for AMI  >=14 - Abnormal Female indicating possible myocardial injury.  >=22 - Abnormal Male indicating possible myocardial injury.   Clinicians would have to utilize clinical acumen, EKG, Troponin, and serial changes to determine if it is an Acute Myocardial Infarction or myocardial injury due to an underlying chronic condition.        D-DIMER, QUANTITATIVE - Normal    Narrative:     According to the assay 's published package insert, a normal (<0.50 MCGFEU/mL) D-dimer result in conjunction with a non-high clinical probability assessment, excludes deep vein thrombosis (DVT) and pulmonary embolism (PE) with high sensitivity.    D-dimer values increase with age and this can make VTE exclusion of an older population difficult. To address this, the [employer], based on best available evidence and recent guidelines, recommends that clinicians use age-adjusted D-dimer thresholds in patients greater than 50 years of age with: a)  "a low probability of PE who do not meet all Pulmonary Embolism Rule Out Criteria, or b) in those with intermediate probability of PE.   The formula for an age-adjusted D-dimer cut-off is \"age/100\".  For example, a 60 year old patient would have an age-adjusted cut-off of 0.60 MCGFEU/mL and an 80 year old 0.80 MCGFEU/mL.   TROPONIN - Normal    Narrative:     High Sensitive Troponin T Reference Range:  <14.0 ng/L- Negative Female for AMI  <22.0 ng/L- Negative Male for AMI  >=14 - Abnormal Female indicating possible myocardial injury.  >=22 - Abnormal Male indicating possible myocardial injury.   Clinicians would have to utilize clinical acumen, EKG, Troponin, and serial changes to determine if it is an Acute Myocardial Infarction or myocardial injury due to an underlying chronic condition.        LIPID PANEL   POCT PROTIME - INR   CBC AND DIFFERENTIAL    Narrative:     The following orders were created for panel order CBC & Differential.  Procedure                               Abnormality         Status                     ---------                               -----------         ------                     CBC Auto Differential[[id number]]        Abnormal            Final result                 Please view results for these tests on the individual orders.       EKG:   ECG 12 Lead Chest Pain   Preliminary Result   HEART RATE=70  bpm   RR Itmqtkrz=354  ms   OK Hurccfiv=923  ms   P Horizontal Axis=1  deg   P Front Axis=47  deg   QRSD Interval=87  ms   QT Eoppsmwk=623  ms   XPjF=407  ms   QRS Axis=7  deg   T Wave Axis=12  deg   - BORDERLINE ECG -   Sinus rhythm   Probable left atrial enlargement   ST elev, probable normal early repol pattern   Date and Time of Study:2025-06-27 01:56:18      ECG 12 Lead Chest Pain    (Results Pending)       Meds given in ED:   Medications   lactated ringers infusion (125 mL/hr Intravenous New Bag 6/27/25 0330)   sodium chloride 0.9 % flush 10 mL (has no administration in time range) "   sodium chloride 0.9 % flush 10 mL (has no administration in time range)   sodium chloride 0.9 % infusion 40 mL (has no administration in time range)   ondansetron ODT (ZOFRAN-ODT) disintegrating tablet 4 mg (has no administration in time range)     Or   ondansetron (ZOFRAN) injection 4 mg (has no administration in time range)   nitroglycerin (NITROSTAT) SL tablet 0.4 mg (has no administration in time range)   aspirin chewable tablet 324 mg (324 mg Oral Given 6/27/25 6748)       Imaging results:  No radiology results for the last day    Ambulatory status:   - Up ad lon;    Social issues:   Social History     Socioeconomic History    Marital status:    Tobacco Use    Smoking status: Never    Smokeless tobacco: Never   Vaping Use    Vaping status: Never Used   Substance and Sexual Activity    Alcohol use: Yes    Drug use: Never    Sexual activity: Yes     Partners: Male       Peripheral Neurovascular  Peripheral Neurovascular (Adult)  Peripheral Neurovascular WDL: WDL    Neuro Cognitive  Neuro Cognitive (Adult)  Cognitive/Neuro/Behavioral WDL: WDL  Sedation Group  POSS (Pasero Opioid-Induced Sed Scale): S - Sleep, easy to arouse    Learning  Learning Assessment  Learning Readiness and Ability: no barriers identified    Respiratory  Respiratory  Airway WDL: WDL  Respiratory WDL  Respiratory WDL: WDL    Abdominal Pain       Pain Assessments  Pain (Adult)  (0-10) Pain Rating: Rest: 0  (0-10) Pain Rating: Activity: 0  Patient requested Medication Prescribed for Lower Pain Scale: No  Pain Location: chest  Pain Management Interventions: position adjusted  Response to Pain Interventions: interventions effective per patient    NIH Stroke Scale       Enedelia Adair RN  06/27/25 04:57 EDT

## 2025-06-30 ENCOUNTER — TRANSITIONAL CARE MANAGEMENT TELEPHONE ENCOUNTER (OUTPATIENT)
Dept: CALL CENTER | Facility: HOSPITAL | Age: 50
End: 2025-06-30
Payer: COMMERCIAL

## 2025-06-30 NOTE — OUTREACH NOTE
Call Center TCM Note      Flowsheet Row Responses   [facility], [place] patient discharged from? [place]   Does the patient have one of the following disease processes/diagnoses(primary or secondary)? Other   TCM attempt successful? Yes   Call start time 1238   Call end time 1244   Discharge diagnosis Chest pain   Person spoke with today (if not patient) and relationship pt   Meds reviewed with patient/caregiver? Yes   Is the patient having any side effects they believe may be caused by any medication additions or changes? No   Does the patient have all medications ordered at discharge? Yes   Is the patient taking all medications as directed (includes completed medication regime)? Yes   Does the patient have an appointment with their PCP within 7-14 days of discharge? No  [Pt sees non- PCP, Dr Rivera Crow]   Psychosocial issues? No   Did the patient receive a copy of their discharge instructions? Yes   Nursing interventions Reviewed instructions with patient   What is the patient's perception of their health status since discharge? Improving   Is the patient/caregiver able to teach back signs and symptoms related to disease process for when to call PCP? Yes   Is the patient/caregiver able to teach back signs and symptoms related to disease process for when to call 911? Yes   Is the patient/caregiver able to teach back the hierarchy of who to call/visit for symptoms/problems? PCP, Specialist, Home health nurse, Urgent Care, ED, 911 Yes   If the patient is a current smoker, are they able to teach back resources for cessation? Not a smoker   TCM call completed? Yes   Wrap up additional comments Pt denies any chest pain, and BP today is 134/86, HR 77. Reviewed AVS/meds with pt. Pt will make an appt with non- PCP, and advised to make an appt with cardiology,  directed to AVS page one for cardiology phone number.   Call end time 1244   Would this patient benefit from a Referral to [facility] Social Work? No   Is the patient  interested in additional calls from an ambulatory ? Marge TSE - Registered Nurse    6/30/2025, 12:47 EDT

## 2025-07-02 NOTE — CASE MANAGEMENT/SOCIAL WORK
Case Management Discharge Note      Final Note: Home via private vehicle         Selected Continued Care - Discharged on 6/27/2025 Admission date: 6/27/2025 - Discharge disposition: Home or Self Care      Destination    No services have been selected for the patient.                Durable Medical Equipment    No services have been selected for the patient.                Dialysis/Infusion    No services have been selected for the patient.                Home Medical Care    No services have been selected for the patient.                Therapy    No services have been selected for the patient.                Community Resources    No services have been selected for the patient.                Community & DME    No services have been selected for the patient.                    Transportation Services  Transportation: Private Transportation  Private: Car    Final Discharge Disposition Code: 01 - home or self-care